# Patient Record
Sex: FEMALE | Race: WHITE | NOT HISPANIC OR LATINO | URBAN - METROPOLITAN AREA
[De-identification: names, ages, dates, MRNs, and addresses within clinical notes are randomized per-mention and may not be internally consistent; named-entity substitution may affect disease eponyms.]

---

## 2024-09-03 ENCOUNTER — APPOINTMENT (OUTPATIENT)
Dept: AUDIOLOGY | Facility: CLINIC | Age: 64
End: 2024-09-03
Payer: COMMERCIAL

## 2024-11-10 NOTE — PROGRESS NOTES
Subjective   Patient ID: Syeda Woo is a 64 y.o. female who presents for No chief complaint on file..  HPI  This 64-year-old female last seen in the office in August 2023 is being seen in follow-up on sensorineural hearing loss.  An asymmetric loss have been noted on previous testing.  She had also been treated in the past for otitis media with effusion involving her right ear.  There is been no recent history of recurrence of that and her respiratory health has been generally stable.  She does have a history of thyroid cancer and TMJ inflammation.  Review of Systems  A 12 point ROS has been reviewed and are negative for complaint except what is stated in the history of present illness and/or past medical history as noted in the EMR  Past Medical History:   Diagnosis Date    Bladder disorder, unspecified     Bladder disorder    Personal history of malignant neoplasm of breast     History of malignant neoplasm of breast    Personal history of malignant neoplasm of thyroid     History of malignant neoplasm of thyroid    Personal history of other diseases of the digestive system     History of gastroesophageal reflux (GERD)        No current outpatient medications on file.     Social History     Tobacco Use    Smoking status: Not on file    Smokeless tobacco: Not on file   Substance Use Topics    Alcohol use: Not on file       Not on File    There were no vitals taken for this visit.    Objective   Physical Exam  EXAMINATION:     GENERAL NELLIE.EARANCE: Alert, in no acute distress, normal pitch and clarity of voice, well-developed and nourished, cooperative.     HEAD/FACE: Normocephalic, atraumatic, normal facial movements and strength, no no tenderness to palpation, no lesions noted.     SKIN: Normal turgor, no raised or ulcerative lesions, warm and dry to palpation.     EYES: Extraocular motions intact, no nystagmus noted, pupils equal and reactive to light and accommodation, no conjunctivitis.     EARS: Both  ears--external ear anatomy is normal without lesions, auditory canals are patent and without skin abrasions or lesions,  cerumen was removed from the right ear, hearing is intact to voice, tympanic membranes are intact with no acute inflammation, light reflexes present, no effusions are noted and no mastoid tenderness found to palpation.     NOSE: No external skin lesions are noted, nares are patent, septum is intact, sinuses are nontender to palpation bilaterally, no intranasal lesions or inflammation is noted, nasal valve is normal.     OROPHARYNX/ORAL CAVITY: Oropharynx is not inflamed and is without lesions, mucosa of the oral cavity is intact and without lesions, tongue is midline and mobile, no acute dental disease is noted, TMJs are mobile     NECK: No lymphadenopathy is palpated, carotid pulses are intact, neck is supple with full range of motion, no thyroid abnormalities are noted, trachea is midline, no neck masses are palpated.     LYMPHATICS: No cervical adenopathy or supraclavicular adenopathy is palpated.     NEUROLOGIC/PSYCH; alert and oriented, cranial nerves are grossly intact, gait is without falling, no motor deficits are noted.    Her audiogram today continues to show borderline normal low tone thresholds normal [place] and mild high-frequency hearing loss on the left mild to moderate on the right.  This is stable showing no change from 1 year ago.  Discrimination scores are 100% bilaterally at 55 dB.  Tympanograms are normal  Assessment/Plan   Problem List Items Addressed This Visit    None  Visit Diagnoses         Codes    Sensorineural hearing loss (SNHL) of both ears    -  Primary H90.3          I discussed the clinical finds with the patient.  Her hearing test is stable showing no change in the asymmetry in the high frequencies which has actually improved over the last 2 cycles.  She has borderline low-frequency hearing which also has not changed.  She is having no functional issues denies  difficulties with  vertigo and has only had brief episodes of tinnitus nothing that is longstanding.  She is having no ear pain or difficulties with respiratory health and her swallowing and voice have been stable.  Follow-up was discussed which should be anywhere between 1 to 3 years.  She request a yearly follow-up which will be ordered for her.  If she has any sudden change over the year she should contact the office and she will continue to follow-up with her primary care doctor.       Raleigh Armas DMD, MD 11/10/24 4:09 PM

## 2024-11-13 ENCOUNTER — APPOINTMENT (OUTPATIENT)
Dept: AUDIOLOGY | Facility: CLINIC | Age: 64
End: 2024-11-13
Payer: COMMERCIAL

## 2024-11-13 ENCOUNTER — APPOINTMENT (OUTPATIENT)
Dept: OTOLARYNGOLOGY | Facility: CLINIC | Age: 64
End: 2024-11-13
Payer: COMMERCIAL

## 2024-11-13 VITALS — HEIGHT: 62 IN | BODY MASS INDEX: 25.76 KG/M2 | WEIGHT: 140 LBS

## 2024-11-13 DIAGNOSIS — H90.3 SENSORINEURAL HEARING LOSS (SNHL) OF BOTH EARS: Primary | ICD-10-CM

## 2024-11-13 DIAGNOSIS — H90.3 SENSORINEURAL HEARING LOSS, BILATERAL: Primary | ICD-10-CM

## 2024-11-13 DIAGNOSIS — H93.13 TINNITUS OF BOTH EARS: ICD-10-CM

## 2024-11-13 PROBLEM — K90.0 ADULT CELIAC DISEASE (HHS-HCC): Status: ACTIVE | Noted: 2024-11-13

## 2024-11-13 PROBLEM — R53.83 FATIGUE: Status: ACTIVE | Noted: 2022-07-14

## 2024-11-13 PROBLEM — L03.90 CELLULITIS: Status: ACTIVE | Noted: 2018-04-10

## 2024-11-13 PROBLEM — H90.A31 MIXED CONDUCTIVE AND SENSORINEURAL HEARING LOSS, UNILATERAL, RIGHT EAR WITH RESTRICTED HEARING ON THE CONTRALATERAL SIDE: Status: ACTIVE | Noted: 2024-11-13

## 2024-11-13 PROBLEM — M26.69 TMJ INFLAMMATION: Status: ACTIVE | Noted: 2024-11-13

## 2024-11-13 PROBLEM — K90.9 MALABSORPTION (HHS-HCC): Status: ACTIVE | Noted: 2024-11-13

## 2024-11-13 PROBLEM — C73 MALIGNANT NEOPLASM OF THYROID GLAND (MULTI): Status: ACTIVE | Noted: 2024-11-13

## 2024-11-13 PROBLEM — H92.01 REFERRED OTALGIA OF RIGHT EAR: Status: ACTIVE | Noted: 2024-11-13

## 2024-11-13 PROBLEM — E66.3 OVERWEIGHT (BMI 25.0-29.9): Status: ACTIVE | Noted: 2017-07-28

## 2024-11-13 PROBLEM — H65.91 OTITIS MEDIA WITH EFFUSION, RIGHT: Status: ACTIVE | Noted: 2024-11-13

## 2024-11-13 PROBLEM — I97.2 POST-MASTECTOMY LYMPHEDEMA SYNDROME: Status: ACTIVE | Noted: 2018-12-19

## 2024-11-13 PROBLEM — N30.10 INTERSTITIAL CYSTITIS: Status: ACTIVE | Noted: 2020-06-29

## 2024-11-13 PROBLEM — E61.1 IRON DEFICIENCY: Status: ACTIVE | Noted: 2024-11-13

## 2024-11-13 PROBLEM — E55.9 VITAMIN D DEFICIENCY: Status: ACTIVE | Noted: 2024-11-13

## 2024-11-13 PROBLEM — H90.A22 SENSORINEURAL HEARING LOSS (SNHL) OF LEFT EAR WITH RESTRICTED HEARING OF RIGHT EAR: Status: ACTIVE | Noted: 2024-11-13

## 2024-11-13 PROBLEM — E89.0 HYPOTHYROIDISM, POSTSURGICAL: Status: ACTIVE | Noted: 2024-11-13

## 2024-11-13 PROBLEM — E87.1 HYPONATREMIA: Status: ACTIVE | Noted: 2022-07-14

## 2024-11-13 PROCEDURE — 92567 TYMPANOMETRY: CPT | Performed by: AUDIOLOGIST

## 2024-11-13 PROCEDURE — 99213 OFFICE O/P EST LOW 20 MIN: CPT | Performed by: OTOLARYNGOLOGY

## 2024-11-13 PROCEDURE — 1159F MED LIST DOCD IN RCRD: CPT | Performed by: OTOLARYNGOLOGY

## 2024-11-13 PROCEDURE — 1036F TOBACCO NON-USER: CPT | Performed by: OTOLARYNGOLOGY

## 2024-11-13 PROCEDURE — 92557 COMPREHENSIVE HEARING TEST: CPT | Performed by: AUDIOLOGIST

## 2024-11-13 PROCEDURE — 3008F BODY MASS INDEX DOCD: CPT | Performed by: OTOLARYNGOLOGY

## 2024-11-13 PROCEDURE — 1160F RVW MEDS BY RX/DR IN RCRD: CPT | Performed by: OTOLARYNGOLOGY

## 2024-11-13 RX ORDER — PHENAZOPYRIDINE HYDROCHLORIDE 200 MG/1
200 TABLET, FILM COATED ORAL AS NEEDED
COMMUNITY

## 2024-11-13 RX ORDER — LOSARTAN POTASSIUM 100 MG/1
100 TABLET ORAL DAILY
COMMUNITY

## 2024-11-13 RX ORDER — MIRABEGRON 25 MG/1
25 TABLET, FILM COATED, EXTENDED RELEASE ORAL
COMMUNITY
Start: 2022-02-18

## 2024-11-13 RX ORDER — PRAVASTATIN SODIUM 80 MG/1
80 TABLET ORAL DAILY
COMMUNITY

## 2024-11-13 RX ORDER — DIAZEPAM 5 MG/1
5 TABLET ORAL
COMMUNITY
Start: 2023-08-07

## 2024-11-13 RX ORDER — ESOMEPRAZOLE MAGNESIUM 40 MG/1
40 GRANULE, DELAYED RELEASE ORAL
COMMUNITY

## 2024-11-13 RX ORDER — URINARY ANTISEPTIC ANTISPASMODIC 81.6; 40.8; 10.8; .12 MG/1; MG/1; MG/1; MG/1
1 TABLET ORAL 2 TIMES DAILY PRN
COMMUNITY
Start: 2023-09-11

## 2024-11-13 RX ORDER — CARVEDILOL 3.12 MG/1
1 TABLET ORAL
COMMUNITY
Start: 2023-12-18

## 2024-11-13 RX ORDER — LEVOTHYROXINE SODIUM 112 UG/1
112 TABLET ORAL DAILY
COMMUNITY

## 2024-11-13 RX ORDER — FOLIC ACID 0.8 MG
TABLET ORAL
COMMUNITY
Start: 2022-08-26

## 2024-11-13 RX ORDER — MELOXICAM 15 MG/1
1 TABLET ORAL
COMMUNITY
Start: 2024-10-21

## 2024-11-13 RX ORDER — VIT C/E/ZN/COPPR/LUTEIN/ZEAXAN 250MG-90MG
125 CAPSULE ORAL DAILY
COMMUNITY
Start: 2022-08-26

## 2024-11-13 RX ORDER — SUMATRIPTAN SUCCINATE 100 MG/1
100 TABLET ORAL ONCE AS NEEDED
COMMUNITY
Start: 2022-04-26

## 2024-11-13 RX ORDER — CIMETIDINE 800 MG/1
800 TABLET, FILM COATED ORAL 2 TIMES DAILY
COMMUNITY

## 2024-11-13 RX ORDER — HYDROXYZINE HYDROCHLORIDE 10 MG/1
1 TABLET, FILM COATED ORAL NIGHTLY
COMMUNITY
Start: 2021-10-21

## 2024-11-13 RX ORDER — NITROGLYCERIN 0.4 MG/1
0.4 TABLET SUBLINGUAL EVERY 5 MIN PRN
COMMUNITY

## 2024-11-13 RX ORDER — CYCLOSPORINE 0.5 MG/ML
1 EMULSION OPHTHALMIC EVERY 12 HOURS
COMMUNITY
Start: 2022-03-11

## 2024-11-13 RX ORDER — ALBUTEROL SULFATE 90 UG/1
2 INHALANT RESPIRATORY (INHALATION) EVERY 6 HOURS PRN
COMMUNITY
Start: 2022-08-09

## 2024-11-13 RX ORDER — ZINC SULFATE 50(220)MG
220 CAPSULE ORAL
COMMUNITY
Start: 2024-02-09

## 2024-11-13 RX ORDER — FLUTICASONE PROPIONATE 50 MCG
1 SPRAY, SUSPENSION (ML) NASAL DAILY
COMMUNITY
Start: 2022-08-26

## 2024-11-13 NOTE — PROGRESS NOTES
"AUDIOMETRIC EVALUATION       Name:  Syeda Woo  :  1960  Age:  64 y.o.  Date of Evaluation:  2024     HISTORY  Syeda Woo was seen today for a hearing evaluation due to history of ear infection in the right ear, no recent ear infection. Known high frequency sensorineural hearing loss, see previous hearing evaluations. Reports occasional ringing in both ears that lasts only briefly.    Denies vertigo, aural pain, drainage, fullness, history of familial hearing loss or noise exposure.    PROCEDURE:  Otoscopic Evaluation:    RIGHT: Clear ear canal and tympanic membrane visualized.  LEFT:  Clear ear canal and tympanic membrane visualized.    Immittance: Tympanometry (226 Hz probe tone) and Stapedial Acoustic Reflexes Thresholds (ART)(Probe ear):  RIGHT: Normal middle ear pressure, mobility, and ear canal volume. Ipsilateral ART absent 500-2000 Hz.  LEFT: Normal middle ear pressure, mobility, and ear canal volume. Ipsilateral ART absent 500-2000 Hz.    Pure Tone and Speech Audiometry:    Test Technique: Pure Tone Audiometry via TDH headphones  Test Reliability: good    RIGHT: Essentially Normal hearing through 6000 Hz sloping to mild  sensorineural hearing loss through 8000 Hz. Word Recognition score was excellent using recorded material (NU-6 10-word list ordered by difficulty).   LEFT: Normal hearing through 4000 Hz sloping to moderate  sensorineural hearing loss through 8000 Hz. Word Recognition score was excellent using recorded material (NU-6 10-word list ordered by difficulty).     EVALUATION  See scanned Audiogram in \"Media\".    IMPRESSIONS:  Today's test results indicate stable hearing as compared to previous testing (). Normal middle ear function and essentially normal hearing with a  high frequency sensorineural hearing loss bilaterally.    RECOMMENDATIONS:  Continue medical follow-up with physician.  Return for audiologic assessment in conjunction with otologic care or annually. "     PATIENT EDUCATION:   Discussed results and recommendations with Syeda Woo.  Questions were addressed and the patient was encouraged to contact our department ([phone]) should concerns arise.    KEYON Adams, CCC-A  Senior Clinical Audiologist    TIME: [id number]

## 2025-11-17 ENCOUNTER — APPOINTMENT (OUTPATIENT)
Dept: AUDIOLOGY | Facility: CLINIC | Age: 65
End: 2025-11-17
Payer: COMMERCIAL

## 2025-11-17 ENCOUNTER — APPOINTMENT (OUTPATIENT)
Dept: OTOLARYNGOLOGY | Facility: CLINIC | Age: 65
End: 2025-11-17
Payer: COMMERCIAL